# Patient Record
Sex: FEMALE | Employment: UNEMPLOYED | URBAN - METROPOLITAN AREA
[De-identification: names, ages, dates, MRNs, and addresses within clinical notes are randomized per-mention and may not be internally consistent; named-entity substitution may affect disease eponyms.]

---

## 2019-03-19 ENCOUNTER — OFFICE VISIT (OUTPATIENT)
Dept: INTERNAL MEDICINE CLINIC | Facility: CLINIC | Age: 59
End: 2019-03-19

## 2019-03-19 VITALS
BODY MASS INDEX: 30.4 KG/M2 | WEIGHT: 161 LBS | RESPIRATION RATE: 17 BRPM | SYSTOLIC BLOOD PRESSURE: 112 MMHG | DIASTOLIC BLOOD PRESSURE: 64 MMHG | HEIGHT: 61 IN | OXYGEN SATURATION: 96 % | HEART RATE: 72 BPM

## 2019-03-19 DIAGNOSIS — Z00.00 ANNUAL PHYSICAL EXAM: Primary | ICD-10-CM

## 2019-03-19 DIAGNOSIS — Z12.39 BREAST CANCER SCREENING: ICD-10-CM

## 2019-03-19 DIAGNOSIS — M17.11 ARTHRITIS OF RIGHT KNEE: ICD-10-CM

## 2019-03-19 DIAGNOSIS — M21.42 PES PLANUS OF BOTH FEET: ICD-10-CM

## 2019-03-19 DIAGNOSIS — Z41.1 ENCOUNTER FOR COSMETIC SURGERY: ICD-10-CM

## 2019-03-19 DIAGNOSIS — M21.41 PES PLANUS OF BOTH FEET: ICD-10-CM

## 2019-03-19 DIAGNOSIS — M72.2 PLANTAR FASCIITIS OF LEFT FOOT: ICD-10-CM

## 2019-03-19 PROBLEM — M21.40 FLAT FOOT: Status: ACTIVE | Noted: 2019-03-19

## 2019-03-19 PROCEDURE — 99386 PREV VISIT NEW AGE 40-64: CPT | Performed by: FAMILY MEDICINE

## 2019-03-20 NOTE — PROGRESS NOTES
HPI:   Brittny Rainey is a 62year old female who presents for a complete physical exam/BECOMING ESTABLISHED  Last pap:  2 YEARS AGO--NORMAL  Last mammogram:  2 YEARS AGO  Family hx of breast, ovarian, cervical or colon CA:  NONE      PATIENT STATES SHE HA PROMINENT BAGS UNDER BOTH EYES, NO PTOSIS  NECK: supple,no adenopathy  LUNGS: clear to auscultation  CARDIO: RRR without murmur  GI: good BS's,no masses, HSM or tenderness  EXTREMITIES: no edema  MSKL:  TENDERNESS OVER RIGHT ANTEROMEDIAL KNEE, NO EFFUSIONS

## 2019-05-18 ENCOUNTER — HOSPITAL ENCOUNTER (OUTPATIENT)
Dept: MAMMOGRAPHY | Facility: HOSPITAL | Age: 59
Discharge: HOME | End: 2019-05-18
Attending: FAMILY MEDICINE
Payer: COMMERCIAL

## 2019-05-18 DIAGNOSIS — Z12.39 BREAST CANCER SCREENING: ICD-10-CM

## 2019-05-18 PROCEDURE — 77067 SCR MAMMO BI INCL CAD: CPT | Performed by: FAMILY MEDICINE

## 2019-05-18 PROCEDURE — 77063 BREAST TOMOSYNTHESIS BI: CPT | Performed by: FAMILY MEDICINE

## 2019-06-28 ENCOUNTER — TELEPHONE (OUTPATIENT)
Dept: INTERNAL MEDICINE CLINIC | Facility: CLINIC | Age: 59
End: 2019-06-28

## 2019-06-28 ENCOUNTER — OFFICE VISIT (OUTPATIENT)
Dept: INTERNAL MEDICINE CLINIC | Facility: CLINIC | Age: 59
End: 2019-06-28

## 2019-06-28 VITALS
DIASTOLIC BLOOD PRESSURE: 72 MMHG | OXYGEN SATURATION: 96 % | WEIGHT: 159 LBS | HEART RATE: 73 BPM | SYSTOLIC BLOOD PRESSURE: 118 MMHG | HEIGHT: 61 IN | BODY MASS INDEX: 30.02 KG/M2

## 2019-06-28 DIAGNOSIS — Z00.00 ANNUAL PHYSICAL EXAM: ICD-10-CM

## 2019-06-28 DIAGNOSIS — M62.830 SPASM OF THORACIC BACK MUSCLE: ICD-10-CM

## 2019-06-28 DIAGNOSIS — J98.01 COUGH DUE TO BRONCHOSPASM: Primary | ICD-10-CM

## 2019-06-28 PROCEDURE — 99213 OFFICE O/P EST LOW 20 MIN: CPT | Performed by: FAMILY MEDICINE

## 2019-06-28 RX ORDER — ALBUTEROL SULFATE 90 UG/1
2 AEROSOL, METERED RESPIRATORY (INHALATION) EVERY 4 HOURS PRN
Qty: 1 INHALER | Refills: 3 | Status: SHIPPED | OUTPATIENT
Start: 2019-06-28 | End: 2021-02-25 | Stop reason: ALTCHOICE

## 2019-06-28 RX ORDER — BACLOFEN 10 MG/1
10 TABLET ORAL 2 TIMES DAILY PRN
Qty: 30 TABLET | Refills: 3 | Status: SHIPPED | OUTPATIENT
Start: 2019-06-28 | End: 2021-02-25 | Stop reason: ALTCHOICE

## 2019-06-28 NOTE — PROGRESS NOTES
CC:  Sore Throat (Patient complains of sore throat) and Cough (Patient complains of cough)      Hx of CC:  ONSET OF URI 3 DAYS AGO WITH PERSISTENT COUGH. C/O RIGHT UPPER SHOULDER AND BACK MUSCLE TIGHTNESS. 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     Vital

## 2019-07-06 ENCOUNTER — NURSE ONLY (OUTPATIENT)
Dept: INTERNAL MEDICINE CLINIC | Facility: CLINIC | Age: 59
End: 2019-07-06

## 2019-07-06 DIAGNOSIS — Z00.00 ANNUAL PHYSICAL EXAM: ICD-10-CM

## 2019-07-06 LAB
ALBUMIN SERPL-MCNC: 3.6 G/DL (ref 3.4–5)
ALBUMIN/GLOB SERPL: 1 {RATIO} (ref 1–2)
ALP LIVER SERPL-CCNC: 110 U/L (ref 46–118)
ALT SERPL-CCNC: 20 U/L (ref 13–56)
ANION GAP SERPL CALC-SCNC: 5 MMOL/L (ref 0–18)
AST SERPL-CCNC: 19 U/L (ref 15–37)
BILIRUB SERPL-MCNC: 0.3 MG/DL (ref 0.1–2)
BUN BLD-MCNC: 15 MG/DL (ref 7–18)
BUN/CREAT SERPL: 20 (ref 10–20)
CALCIUM BLD-MCNC: 9.4 MG/DL (ref 8.5–10.1)
CHLORIDE SERPL-SCNC: 113 MMOL/L (ref 98–112)
CHOLEST SMN-MCNC: 170 MG/DL (ref ?–200)
CO2 SERPL-SCNC: 27 MMOL/L (ref 21–32)
CREAT BLD-MCNC: 0.75 MG/DL (ref 0.55–1.02)
GLOBULIN PLAS-MCNC: 3.6 G/DL (ref 2.8–4.4)
GLUCOSE BLD-MCNC: 99 MG/DL (ref 70–99)
HDLC SERPL-MCNC: 48 MG/DL (ref 40–59)
LDLC SERPL CALC-MCNC: 94 MG/DL (ref ?–100)
M PROTEIN MFR SERPL ELPH: 7.2 G/DL (ref 6.4–8.2)
NONHDLC SERPL-MCNC: 122 MG/DL (ref ?–130)
OSMOLALITY SERPL CALC.SUM OF ELEC: 301 MOSM/KG (ref 275–295)
PATIENT FASTING: YES
PATIENT FASTING: YES
POTASSIUM SERPL-SCNC: 4.1 MMOL/L (ref 3.5–5.1)
SODIUM SERPL-SCNC: 145 MMOL/L (ref 136–145)
TRIGL SERPL-MCNC: 140 MG/DL (ref 30–149)
VLDLC SERPL CALC-MCNC: 28 MG/DL (ref 0–30)

## 2019-07-06 PROCEDURE — 80053 COMPREHEN METABOLIC PANEL: CPT | Performed by: FAMILY MEDICINE

## 2019-07-06 PROCEDURE — 80061 LIPID PANEL: CPT | Performed by: FAMILY MEDICINE

## 2019-07-06 PROCEDURE — 36415 COLL VENOUS BLD VENIPUNCTURE: CPT | Performed by: FAMILY MEDICINE

## 2019-09-19 ENCOUNTER — OFFICE VISIT (OUTPATIENT)
Dept: INTERNAL MEDICINE CLINIC | Facility: CLINIC | Age: 59
End: 2019-09-19

## 2019-09-19 VITALS
SYSTOLIC BLOOD PRESSURE: 118 MMHG | HEIGHT: 61 IN | OXYGEN SATURATION: 98 % | DIASTOLIC BLOOD PRESSURE: 70 MMHG | TEMPERATURE: 98 F | RESPIRATION RATE: 18 BRPM | BODY MASS INDEX: 30.33 KG/M2 | WEIGHT: 160.63 LBS | HEART RATE: 64 BPM

## 2019-09-19 DIAGNOSIS — M62.830 SPASM OF THORACIC BACK MUSCLE: ICD-10-CM

## 2019-09-19 DIAGNOSIS — M77.8 TENDINITIS OF RIGHT SHOULDER: Primary | ICD-10-CM

## 2019-09-19 PROCEDURE — 20550 NJX 1 TENDON SHEATH/LIGAMENT: CPT | Performed by: FAMILY MEDICINE

## 2019-09-19 PROCEDURE — 99213 OFFICE O/P EST LOW 20 MIN: CPT | Performed by: FAMILY MEDICINE

## 2019-09-19 RX ORDER — TRIAMCINOLONE ACETONIDE 40 MG/ML
40 INJECTION, SUSPENSION INTRA-ARTICULAR; INTRAMUSCULAR ONCE
Status: COMPLETED | OUTPATIENT
Start: 2019-09-19 | End: 2019-09-19

## 2019-09-19 RX ORDER — NABUMETONE 750 MG/1
750 TABLET, FILM COATED ORAL 2 TIMES DAILY PRN
Qty: 60 TABLET | Refills: 0 | Status: SHIPPED | OUTPATIENT
Start: 2019-09-19 | End: 2021-02-25 | Stop reason: ALTCHOICE

## 2019-09-19 RX ADMIN — TRIAMCINOLONE ACETONIDE 40 MG: 40 INJECTION, SUSPENSION INTRA-ARTICULAR; INTRAMUSCULAR at 17:28:00

## 2019-09-20 NOTE — PROGRESS NOTES
CC:  Follow - Up (f/u on test results) and Shoulder Pain (pain on shoulder)      Hx of CC:  CHRONIC WORSENING RIGHT SHOULDER PAIN. 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  ALSO WITH LEFT UPPER BACK PAIN AND TENSION. STRETCHING, ETC    None  No orders of the defined types were placed in this encounter.

## 2019-09-25 ENCOUNTER — TELEPHONE (OUTPATIENT)
Dept: INTERNAL MEDICINE CLINIC | Facility: CLINIC | Age: 59
End: 2019-09-25

## 2019-12-27 NOTE — TELEPHONE ENCOUNTER
Refaxed medical records request to [facility] @ fax # [phone]. Requesting Colonoscopy results.  **2nd request**

## 2021-02-25 ENCOUNTER — OFFICE VISIT (OUTPATIENT)
Dept: INTERNAL MEDICINE CLINIC | Facility: CLINIC | Age: 61
End: 2021-02-25
Payer: MEDICAID

## 2021-02-25 VITALS
OXYGEN SATURATION: 98 % | WEIGHT: 177 LBS | BODY MASS INDEX: 33.42 KG/M2 | HEART RATE: 70 BPM | DIASTOLIC BLOOD PRESSURE: 68 MMHG | RESPIRATION RATE: 16 BRPM | HEIGHT: 61 IN | SYSTOLIC BLOOD PRESSURE: 126 MMHG

## 2021-02-25 DIAGNOSIS — Z01.419 PAP SMEAR, AS PART OF ROUTINE GYNECOLOGICAL EXAMINATION: ICD-10-CM

## 2021-02-25 DIAGNOSIS — R23.8 EASY BRUISING: ICD-10-CM

## 2021-02-25 DIAGNOSIS — Z12.11 SCREENING FOR COLON CANCER: ICD-10-CM

## 2021-02-25 DIAGNOSIS — Z12.31 ENCOUNTER FOR SCREENING MAMMOGRAM FOR MALIGNANT NEOPLASM OF BREAST: ICD-10-CM

## 2021-02-25 DIAGNOSIS — Z13.220 SCREENING FOR LIPID DISORDERS: ICD-10-CM

## 2021-02-25 DIAGNOSIS — Z00.00 PREVENTATIVE HEALTH CARE: Primary | ICD-10-CM

## 2021-02-25 DIAGNOSIS — Z13.1 SCREENING FOR DIABETES MELLITUS: ICD-10-CM

## 2021-02-25 DIAGNOSIS — R13.10 SWALLOWING PROBLEM: ICD-10-CM

## 2021-02-25 LAB
ALBUMIN SERPL-MCNC: 3.8 G/DL (ref 3.4–5)
ALBUMIN/GLOB SERPL: 1 {RATIO} (ref 1–2)
ALP LIVER SERPL-CCNC: 129 U/L
ALT SERPL-CCNC: 22 U/L
ANION GAP SERPL CALC-SCNC: 2 MMOL/L (ref 0–18)
AST SERPL-CCNC: 18 U/L (ref 15–37)
BILIRUB SERPL-MCNC: 0.4 MG/DL (ref 0.1–2)
BUN BLD-MCNC: 13 MG/DL (ref 7–18)
BUN/CREAT SERPL: 17.8 (ref 10–20)
CALCIUM BLD-MCNC: 9.5 MG/DL (ref 8.5–10.1)
CHLORIDE SERPL-SCNC: 108 MMOL/L (ref 98–112)
CHOLEST SMN-MCNC: 238 MG/DL (ref ?–200)
CO2 SERPL-SCNC: 32 MMOL/L (ref 21–32)
CREAT BLD-MCNC: 0.73 MG/DL
DEPRECATED RDW RBC AUTO: 43.1 FL (ref 35.1–46.3)
ERYTHROCYTE [DISTWIDTH] IN BLOOD BY AUTOMATED COUNT: 12.4 % (ref 11–15)
EST. AVERAGE GLUCOSE BLD GHB EST-MCNC: 134 MG/DL (ref 68–126)
GLOBULIN PLAS-MCNC: 3.7 G/DL (ref 2.8–4.4)
GLUCOSE BLD-MCNC: 84 MG/DL (ref 70–99)
HBA1C MFR BLD HPLC: 6.3 % (ref ?–5.7)
HCT VFR BLD AUTO: 45.4 %
HDLC SERPL-MCNC: 49 MG/DL (ref 40–59)
HGB BLD-MCNC: 14.6 G/DL
LDLC SERPL CALC-MCNC: 147 MG/DL (ref ?–100)
M PROTEIN MFR SERPL ELPH: 7.5 G/DL (ref 6.4–8.2)
MCH RBC QN AUTO: 30.4 PG (ref 26–34)
MCHC RBC AUTO-ENTMCNC: 32.2 G/DL (ref 31–37)
MCV RBC AUTO: 94.6 FL
NONHDLC SERPL-MCNC: 189 MG/DL (ref ?–130)
OSMOLALITY SERPL CALC.SUM OF ELEC: 293 MOSM/KG (ref 275–295)
PATIENT FASTING Y/N/NP: YES
PATIENT FASTING Y/N/NP: YES
PLATELET # BLD AUTO: 268 10(3)UL (ref 150–450)
POTASSIUM SERPL-SCNC: 3.9 MMOL/L (ref 3.5–5.1)
RBC # BLD AUTO: 4.8 X10(6)UL
SODIUM SERPL-SCNC: 142 MMOL/L (ref 136–145)
TRIGL SERPL-MCNC: 212 MG/DL (ref 30–149)
VLDLC SERPL CALC-MCNC: 42 MG/DL (ref 0–30)
WBC # BLD AUTO: 8.1 X10(3) UL (ref 4–11)

## 2021-02-25 PROCEDURE — 83036 HEMOGLOBIN GLYCOSYLATED A1C: CPT | Performed by: FAMILY MEDICINE

## 2021-02-25 PROCEDURE — 3078F DIAST BP <80 MM HG: CPT | Performed by: FAMILY MEDICINE

## 2021-02-25 PROCEDURE — 80061 LIPID PANEL: CPT | Performed by: FAMILY MEDICINE

## 2021-02-25 PROCEDURE — 88175 CYTOPATH C/V AUTO FLUID REDO: CPT | Performed by: FAMILY MEDICINE

## 2021-02-25 PROCEDURE — 87624 HPV HI-RISK TYP POOLED RSLT: CPT | Performed by: FAMILY MEDICINE

## 2021-02-25 PROCEDURE — 3074F SYST BP LT 130 MM HG: CPT | Performed by: FAMILY MEDICINE

## 2021-02-25 PROCEDURE — 99396 PREV VISIT EST AGE 40-64: CPT | Performed by: FAMILY MEDICINE

## 2021-02-25 PROCEDURE — 85027 COMPLETE CBC AUTOMATED: CPT | Performed by: FAMILY MEDICINE

## 2021-02-25 PROCEDURE — 3008F BODY MASS INDEX DOCD: CPT | Performed by: FAMILY MEDICINE

## 2021-02-25 PROCEDURE — 80053 COMPREHEN METABOLIC PANEL: CPT | Performed by: FAMILY MEDICINE

## 2021-02-25 NOTE — PROGRESS NOTES
PATIENT IDENTIFICATION  Name: Brooke Russell  MRN: [id number]    Diagnoses:   Preventative health care  (primary encounter diagnosis)  Screening for lipid disorders  Pap smear, as part of routine gynecological examination  Screening for diabetes mellitus Known Problems Maternal Cousin Male    • No Known Problems Paternal Cousin Female    • No Known Problems Paternal Cousin Male    • No Known Problems Other    • No Known Problems Father    • Breast Cancer Neg    • Ovarian Cancer Neg    • DCIS Neg    • LCIS (CPT=77067/99126); Future    6. Screening for colon cancer  - GASTRO - INTERNAL    7. Easy bruising  - CBC, PLATELET; NO DIFFERENTIAL    8. Swallowing problem  - ENT - INTERNAL  1-7.  Discussed importance of lifestyle changes including diet and regular phys

## 2021-02-26 LAB — HPV I/H RISK 1 DNA SPEC QL NAA+PROBE: NEGATIVE

## 2021-08-20 ENCOUNTER — OFFICE VISIT (OUTPATIENT)
Dept: INTERNAL MEDICINE CLINIC | Facility: CLINIC | Age: 61
End: 2021-08-20
Payer: MEDICAID

## 2021-08-20 VITALS
WEIGHT: 174.63 LBS | OXYGEN SATURATION: 99 % | DIASTOLIC BLOOD PRESSURE: 76 MMHG | HEIGHT: 61 IN | HEART RATE: 70 BPM | BODY MASS INDEX: 32.97 KG/M2 | SYSTOLIC BLOOD PRESSURE: 118 MMHG

## 2021-08-20 DIAGNOSIS — R74.8 ELEVATED ALKALINE PHOSPHATASE LEVEL: Primary | ICD-10-CM

## 2021-08-20 DIAGNOSIS — I83.93 ASYMPTOMATIC VARICOSE VEINS OF BOTH LOWER EXTREMITIES: ICD-10-CM

## 2021-08-20 DIAGNOSIS — M72.2 PLANTAR FASCIITIS OF LEFT FOOT: ICD-10-CM

## 2021-08-20 DIAGNOSIS — R73.03 PREDIABETES: ICD-10-CM

## 2021-08-20 PROCEDURE — 3074F SYST BP LT 130 MM HG: CPT | Performed by: FAMILY MEDICINE

## 2021-08-20 PROCEDURE — 99214 OFFICE O/P EST MOD 30 MIN: CPT | Performed by: FAMILY MEDICINE

## 2021-08-20 PROCEDURE — 3008F BODY MASS INDEX DOCD: CPT | Performed by: FAMILY MEDICINE

## 2021-08-20 PROCEDURE — 3078F DIAST BP <80 MM HG: CPT | Performed by: FAMILY MEDICINE

## 2021-08-20 NOTE — PROGRESS NOTES
PATIENT IDENTIFICATION  Name: Pranay Rausch  MRN: [id number]    Diagnoses:   Elevated alkaline phosphatase level  (primary encounter diagnosis)  Prediabetes  Plantar fasciitis of left foot  Asymptomatic varicose veins of both lower extremities    SUBJECTI BMI 32.99 kg/m²   General Appearance: in no apparent distress and well developed and well nourished  HEENT: Normocephalic, atraumatic,   Lungs: normal respiratory effort. Extremities:no edema of LEs, scattered spider and varicose veins of bilateral LEs.

## 2021-08-25 ENCOUNTER — NURSE ONLY (OUTPATIENT)
Dept: INTERNAL MEDICINE CLINIC | Facility: CLINIC | Age: 61
End: 2021-08-25
Payer: MEDICAID

## 2021-08-25 DIAGNOSIS — R74.8 ELEVATED ALKALINE PHOSPHATASE LEVEL: ICD-10-CM

## 2021-08-25 DIAGNOSIS — R73.03 PREDIABETES: ICD-10-CM

## 2021-08-25 LAB
ALBUMIN SERPL-MCNC: 3.4 G/DL (ref 3.4–5)
ALBUMIN/GLOB SERPL: 0.9 {RATIO} (ref 1–2)
ALP LIVER SERPL-CCNC: 108 U/L
ALT SERPL-CCNC: 26 U/L
ANION GAP SERPL CALC-SCNC: 5 MMOL/L (ref 0–18)
AST SERPL-CCNC: 20 U/L (ref 15–37)
BILIRUB SERPL-MCNC: 0.4 MG/DL (ref 0.1–2)
BUN BLD-MCNC: 15 MG/DL (ref 7–18)
BUN/CREAT SERPL: 20.8 (ref 10–20)
CALCIUM BLD-MCNC: 9.3 MG/DL (ref 8.5–10.1)
CHLORIDE SERPL-SCNC: 110 MMOL/L (ref 98–112)
CO2 SERPL-SCNC: 27 MMOL/L (ref 21–32)
CREAT BLD-MCNC: 0.72 MG/DL
EST. AVERAGE GLUCOSE BLD GHB EST-MCNC: 131 MG/DL (ref 68–126)
GLOBULIN PLAS-MCNC: 3.7 G/DL (ref 2.8–4.4)
GLUCOSE BLD-MCNC: 106 MG/DL (ref 70–99)
HBA1C MFR BLD HPLC: 6.2 % (ref ?–5.7)
M PROTEIN MFR SERPL ELPH: 7.1 G/DL (ref 6.4–8.2)
OSMOLALITY SERPL CALC.SUM OF ELEC: 295 MOSM/KG (ref 275–295)
PATIENT FASTING Y/N/NP: YES
POTASSIUM SERPL-SCNC: 4 MMOL/L (ref 3.5–5.1)
SODIUM SERPL-SCNC: 142 MMOL/L (ref 136–145)

## 2021-08-25 PROCEDURE — 83036 HEMOGLOBIN GLYCOSYLATED A1C: CPT | Performed by: FAMILY MEDICINE

## 2021-08-25 PROCEDURE — 80053 COMPREHEN METABOLIC PANEL: CPT | Performed by: FAMILY MEDICINE

## 2021-08-25 PROCEDURE — 36415 COLL VENOUS BLD VENIPUNCTURE: CPT | Performed by: FAMILY MEDICINE

## 2022-02-15 ENCOUNTER — OFFICE VISIT (OUTPATIENT)
Dept: INTERNAL MEDICINE CLINIC | Facility: CLINIC | Age: 62
End: 2022-02-15
Payer: MEDICAID

## 2022-02-15 VITALS
BODY MASS INDEX: 33.04 KG/M2 | WEIGHT: 175 LBS | DIASTOLIC BLOOD PRESSURE: 68 MMHG | HEART RATE: 75 BPM | HEIGHT: 61 IN | SYSTOLIC BLOOD PRESSURE: 120 MMHG | OXYGEN SATURATION: 98 %

## 2022-02-15 DIAGNOSIS — G89.29 CHRONIC SI JOINT PAIN: ICD-10-CM

## 2022-02-15 DIAGNOSIS — R73.03 PREDIABETES: Primary | ICD-10-CM

## 2022-02-15 DIAGNOSIS — Z12.11 COLON CANCER SCREENING: ICD-10-CM

## 2022-02-15 DIAGNOSIS — M53.3 CHRONIC SI JOINT PAIN: ICD-10-CM

## 2022-02-15 DIAGNOSIS — G44.209 TENSION HEADACHE: ICD-10-CM

## 2022-02-15 PROCEDURE — 3008F BODY MASS INDEX DOCD: CPT | Performed by: FAMILY MEDICINE

## 2022-02-15 PROCEDURE — 3074F SYST BP LT 130 MM HG: CPT | Performed by: FAMILY MEDICINE

## 2022-02-15 PROCEDURE — 99214 OFFICE O/P EST MOD 30 MIN: CPT | Performed by: FAMILY MEDICINE

## 2022-02-15 PROCEDURE — 3078F DIAST BP <80 MM HG: CPT | Performed by: FAMILY MEDICINE

## 2022-04-09 ENCOUNTER — LAB ENCOUNTER (OUTPATIENT)
Dept: LAB | Age: 62
End: 2022-04-09
Attending: FAMILY MEDICINE
Payer: MEDICAID

## 2022-04-09 DIAGNOSIS — Z13.220 SCREENING FOR LIPOID DISORDERS: Primary | ICD-10-CM

## 2022-04-09 LAB
ALBUMIN SERPL-MCNC: 3.4 G/DL (ref 3.4–5)
ALBUMIN/GLOB SERPL: 1 {RATIO} (ref 1–2)
ALP LIVER SERPL-CCNC: 117 U/L
ALT SERPL-CCNC: 22 U/L
ANION GAP SERPL CALC-SCNC: 7 MMOL/L (ref 0–18)
AST SERPL-CCNC: 20 U/L (ref 15–37)
BILIRUB SERPL-MCNC: 0.4 MG/DL (ref 0.1–2)
BUN BLD-MCNC: 15 MG/DL (ref 7–18)
BUN/CREAT SERPL: 21.7 (ref 10–20)
CALCIUM BLD-MCNC: 9.2 MG/DL (ref 8.5–10.1)
CHLORIDE SERPL-SCNC: 106 MMOL/L (ref 98–112)
CHOLEST SERPL-MCNC: 210 MG/DL (ref ?–200)
CO2 SERPL-SCNC: 30 MMOL/L (ref 21–32)
CREAT BLD-MCNC: 0.69 MG/DL
EST. AVERAGE GLUCOSE BLD GHB EST-MCNC: 128 MG/DL (ref 68–126)
FASTING PATIENT LIPID ANSWER: YES
FASTING STATUS PATIENT QL REPORTED: YES
GLOBULIN PLAS-MCNC: 3.4 G/DL (ref 2.8–4.4)
GLUCOSE BLD-MCNC: 102 MG/DL (ref 70–99)
HBA1C MFR BLD: 6.1 % (ref ?–5.7)
HDLC SERPL-MCNC: 50 MG/DL (ref 40–59)
LDLC SERPL CALC-MCNC: 124 MG/DL (ref ?–100)
NONHDLC SERPL-MCNC: 160 MG/DL (ref ?–130)
OSMOLALITY SERPL CALC.SUM OF ELEC: 297 MOSM/KG (ref 275–295)
POTASSIUM SERPL-SCNC: 4.4 MMOL/L (ref 3.5–5.1)
PROT SERPL-MCNC: 6.8 G/DL (ref 6.4–8.2)
SODIUM SERPL-SCNC: 143 MMOL/L (ref 136–145)
TRIGL SERPL-MCNC: 206 MG/DL (ref 30–149)
VLDLC SERPL CALC-MCNC: 37 MG/DL (ref 0–30)

## 2022-04-09 PROCEDURE — 36415 COLL VENOUS BLD VENIPUNCTURE: CPT | Performed by: FAMILY MEDICINE

## 2022-04-09 PROCEDURE — 80061 LIPID PANEL: CPT

## 2022-04-09 PROCEDURE — 80053 COMPREHEN METABOLIC PANEL: CPT | Performed by: FAMILY MEDICINE

## 2022-04-09 PROCEDURE — 83036 HEMOGLOBIN GLYCOSYLATED A1C: CPT | Performed by: FAMILY MEDICINE

## 2022-04-12 ENCOUNTER — OFFICE VISIT (OUTPATIENT)
Dept: INTERNAL MEDICINE CLINIC | Facility: CLINIC | Age: 62
End: 2022-04-12
Payer: MEDICAID

## 2022-04-12 VITALS
DIASTOLIC BLOOD PRESSURE: 70 MMHG | HEART RATE: 68 BPM | HEIGHT: 61 IN | WEIGHT: 171 LBS | BODY MASS INDEX: 32.28 KG/M2 | SYSTOLIC BLOOD PRESSURE: 124 MMHG | OXYGEN SATURATION: 98 %

## 2022-04-12 DIAGNOSIS — R73.03 PREDIABETES: ICD-10-CM

## 2022-04-12 DIAGNOSIS — Z00.00 PREVENTATIVE HEALTH CARE: Primary | ICD-10-CM

## 2022-04-12 DIAGNOSIS — Z12.11 SCREENING FOR COLON CANCER: ICD-10-CM

## 2022-04-12 DIAGNOSIS — Z12.31 ENCOUNTER FOR SCREENING MAMMOGRAM FOR MALIGNANT NEOPLASM OF BREAST: ICD-10-CM

## 2022-04-12 PROCEDURE — 3074F SYST BP LT 130 MM HG: CPT | Performed by: FAMILY MEDICINE

## 2022-04-12 PROCEDURE — 99396 PREV VISIT EST AGE 40-64: CPT | Performed by: FAMILY MEDICINE

## 2022-04-12 PROCEDURE — 3078F DIAST BP <80 MM HG: CPT | Performed by: FAMILY MEDICINE

## 2022-04-12 PROCEDURE — 3008F BODY MASS INDEX DOCD: CPT | Performed by: FAMILY MEDICINE

## 2022-04-15 PROBLEM — R73.03 PREDIABETES: Status: ACTIVE | Noted: 2022-04-15

## 2022-07-13 NOTE — TELEPHONE ENCOUNTER
Faxed over a medical records release form to [facility]. PAST MEDICAL HISTORY:  Cancer of ureter, right history of .. remission    Pacemaker

## 2022-07-28 ENCOUNTER — TELEPHONE (OUTPATIENT)
Dept: GASTROENTEROLOGY | Facility: CLINIC | Age: 62
End: 2022-07-28

## 2022-07-28 ENCOUNTER — OFFICE VISIT (OUTPATIENT)
Dept: GASTROENTEROLOGY | Facility: CLINIC | Age: 62
End: 2022-07-28
Payer: MEDICAID

## 2022-07-28 VITALS
WEIGHT: 173 LBS | DIASTOLIC BLOOD PRESSURE: 75 MMHG | BODY MASS INDEX: 32.66 KG/M2 | HEIGHT: 61 IN | HEART RATE: 63 BPM | SYSTOLIC BLOOD PRESSURE: 129 MMHG

## 2022-07-28 DIAGNOSIS — Z12.11 COLON CANCER SCREENING: ICD-10-CM

## 2022-07-28 DIAGNOSIS — R13.19 ESOPHAGEAL DYSPHAGIA: ICD-10-CM

## 2022-07-28 DIAGNOSIS — R49.0 QUALITY OF VOICE, HOARSE: Primary | ICD-10-CM

## 2022-07-28 DIAGNOSIS — Z12.11 COLON CANCER SCREENING: Primary | ICD-10-CM

## 2022-07-28 PROCEDURE — 3074F SYST BP LT 130 MM HG: CPT | Performed by: NURSE PRACTITIONER

## 2022-07-28 PROCEDURE — 3008F BODY MASS INDEX DOCD: CPT | Performed by: NURSE PRACTITIONER

## 2022-07-28 PROCEDURE — 3078F DIAST BP <80 MM HG: CPT | Performed by: NURSE PRACTITIONER

## 2022-07-28 PROCEDURE — 99244 OFF/OP CNSLTJ NEW/EST MOD 40: CPT | Performed by: NURSE PRACTITIONER

## 2022-07-28 RX ORDER — PANTOPRAZOLE SODIUM 40 MG/1
40 TABLET, DELAYED RELEASE ORAL DAILY
Qty: 30 TABLET | Refills: 1 | Status: SHIPPED | OUTPATIENT
Start: 2022-07-28 | End: 2022-08-27

## 2022-07-28 RX ORDER — POLYETHYLENE GLYCOL 3350, SODIUM CHLORIDE, SODIUM BICARBONATE, POTASSIUM CHLORIDE 420; 11.2; 5.72; 1.48 G/4L; G/4L; G/4L; G/4L
POWDER, FOR SOLUTION ORAL
Qty: 4000 ML | Refills: 0 | Status: SHIPPED | OUTPATIENT
Start: 2022-07-28

## 2022-07-28 NOTE — TELEPHONE ENCOUNTER
Scheduled for: Colonoscopy 43[address]   Provider Name: Dr Viky Dunham   Date: Cele Ryany 10/20/2022  Location: [address] [place] [address] 1   Sedation: MAC   Time: 8:30 am   Prep: split trilyte    Meds/Allergies Reconciled?: NKDA   Diagnosis with codes: crc screening Z12.11  Was patient informed to call insurance with codes (Y/N):  Yes  Referral sent?: Yes   [facility] or [address] notified?:  I sent an electronic request to Endo Scheduling and received a confirmation today. Medication Orders: Pt is aware to NOT take iron pills, herbal meds and diet supplements for 7 days before exam. Also to NOT take any form of alcohol, recreational drugs and any forms of ED meds 24 hours before exam.       Misc Orders: Patient was informed that they will need a COVID 19 test prior to their procedure. Patient verbally understood & will await a phone call from [facility] to schedule. Further instructions given by staff:  Instructions given in Anguillan and pt verbalized understanding

## 2022-11-01 ENCOUNTER — OFFICE VISIT (OUTPATIENT)
Dept: FAMILY MEDICINE CLINIC | Facility: CLINIC | Age: 62
End: 2022-11-01
Payer: MEDICAID

## 2022-11-01 VITALS
HEART RATE: 73 BPM | BODY MASS INDEX: 31.72 KG/M2 | SYSTOLIC BLOOD PRESSURE: 120 MMHG | TEMPERATURE: 97 F | DIASTOLIC BLOOD PRESSURE: 72 MMHG | WEIGHT: 168 LBS | HEIGHT: 61 IN

## 2022-11-01 DIAGNOSIS — Z12.31 ENCOUNTER FOR SCREENING MAMMOGRAM FOR MALIGNANT NEOPLASM OF BREAST: ICD-10-CM

## 2022-11-01 DIAGNOSIS — G89.29 CHRONIC BILATERAL LOW BACK PAIN, UNSPECIFIED WHETHER SCIATICA PRESENT: Primary | ICD-10-CM

## 2022-11-01 DIAGNOSIS — M54.50 CHRONIC BILATERAL LOW BACK PAIN, UNSPECIFIED WHETHER SCIATICA PRESENT: Primary | ICD-10-CM

## 2022-11-01 DIAGNOSIS — M79.672 BILATERAL FOOT PAIN: ICD-10-CM

## 2022-11-01 DIAGNOSIS — M79.671 BILATERAL FOOT PAIN: ICD-10-CM

## 2022-11-01 DIAGNOSIS — N81.9 FEMALE GENITAL PROLAPSE, UNSPECIFIED TYPE: ICD-10-CM

## 2022-11-01 DIAGNOSIS — Z78.0 POST-MENOPAUSAL: ICD-10-CM

## 2022-11-01 PROCEDURE — 3074F SYST BP LT 130 MM HG: CPT | Performed by: FAMILY MEDICINE

## 2022-11-01 PROCEDURE — 99214 OFFICE O/P EST MOD 30 MIN: CPT | Performed by: FAMILY MEDICINE

## 2022-11-01 PROCEDURE — 3008F BODY MASS INDEX DOCD: CPT | Performed by: FAMILY MEDICINE

## 2022-11-01 PROCEDURE — 3078F DIAST BP <80 MM HG: CPT | Performed by: FAMILY MEDICINE

## 2022-11-01 RX ORDER — IBUPROFEN 400 MG/1
400 TABLET ORAL EVERY 8 HOURS PRN
Qty: 30 TABLET | Refills: 0 | Status: SHIPPED | OUTPATIENT
Start: 2022-11-01

## 2022-11-01 RX ORDER — CALCIUM CARBONATE/VITAMIN D3 600 MG-10
1 TABLET ORAL DAILY
Qty: 90 TABLET | Refills: 3 | Status: SHIPPED | OUTPATIENT
Start: 2022-11-01

## 2022-11-30 ENCOUNTER — HOSPITAL ENCOUNTER (OUTPATIENT)
Age: 62
Setting detail: HOSPITAL OUTPATIENT SURGERY
Discharge: HOME | End: 2022-11-30
Attending: INTERNAL MEDICINE | Admitting: INTERNAL MEDICINE
Payer: MEDICAID

## 2022-11-30 ENCOUNTER — ANESTHESIA EVENT (OUTPATIENT)
Dept: ENDOSCOPY | Age: 62
End: 2022-11-30
Payer: MEDICAID

## 2022-11-30 ENCOUNTER — ANESTHESIA (OUTPATIENT)
Dept: ENDOSCOPY | Age: 62
End: 2022-11-30
Payer: MEDICAID

## 2022-11-30 VITALS
HEIGHT: 60 IN | OXYGEN SATURATION: 97 % | RESPIRATION RATE: 15 BRPM | TEMPERATURE: 98 F | DIASTOLIC BLOOD PRESSURE: 63 MMHG | HEART RATE: 55 BPM | SYSTOLIC BLOOD PRESSURE: 124 MMHG | BODY MASS INDEX: 28.47 KG/M2 | WEIGHT: 145 LBS

## 2022-11-30 DIAGNOSIS — Z12.11 COLON CANCER SCREENING: ICD-10-CM

## 2022-11-30 PROCEDURE — 99070 SPECIAL SUPPLIES PHYS/QHP: CPT | Performed by: INTERNAL MEDICINE

## 2022-11-30 PROCEDURE — 45380 COLONOSCOPY AND BIOPSY: CPT | Performed by: INTERNAL MEDICINE

## 2022-11-30 RX ORDER — SODIUM CHLORIDE, SODIUM LACTATE, POTASSIUM CHLORIDE, CALCIUM CHLORIDE 600; 310; 30; 20 MG/100ML; MG/100ML; MG/100ML; MG/100ML
INJECTION, SOLUTION INTRAVENOUS CONTINUOUS
Status: DISCONTINUED | OUTPATIENT
Start: 2022-11-30 | End: 2022-11-30

## 2022-11-30 RX ADMIN — SODIUM CHLORIDE, SODIUM LACTATE, POTASSIUM CHLORIDE, CALCIUM CHLORIDE: 600; 310; 30; 20 INJECTION, SOLUTION INTRAVENOUS at 09:38:00

## 2022-11-30 NOTE — DISCHARGE INSTRUCTIONS

## 2022-12-02 ENCOUNTER — MED REC SCAN ONLY (OUTPATIENT)
Facility: CLINIC | Age: 62
End: 2022-12-02

## 2022-12-30 ENCOUNTER — TELEPHONE (OUTPATIENT)
Facility: CLINIC | Age: 62
End: 2022-12-30

## 2022-12-30 NOTE — TELEPHONE ENCOUNTER
----- Message from Leilani Yin MD sent at 12/29/2022  3:03 PM CST -----  Denies NSAIDs-- Had TI erosions  Follow up with Adam Moscoso NP to discuss EGD/esophagram and possible small bowel imaging.  Ok to use MD approval slot  GI staff: please place recall for colonoscopy in 3 years

## 2022-12-30 NOTE — TELEPHONE ENCOUNTER
Health maintenance updated. Last colonoscopy done 11/30/22   3 year recall placed into Pt Outreach   Next due on 11/30/25 per Dr. Porfirio Clemons. Language Line  [id number]    Left message to call back re: set up appt with Ngoc    CSS: When patient calls back, pls assist setting up appt with Sallyann Saint    Thank you.

## 2023-01-06 NOTE — TELEPHONE ENCOUNTER
Left message to call back using  [facility] ID # [id number]    CSS:  If patient calls back please assist with scheduling office visit with Gloria JERONIMO (ok to offer res 24)

## 2023-01-12 NOTE — TELEPHONE ENCOUNTER
PATRICIA Enamorado    We could not reach patient x4 attempts so no response letter mailed. Thank you    I tried to call directly from our line and again using  [facility] ID # [id number]. Left message to all back both times. CSS:  If patient calls back, please schedule follow up with Connie to use res 24. Our office tried to contact patient on three separate occasions with no answer or call back. No response letter mailed to home address. Encounter closed per protocol.

## 2023-04-01 ENCOUNTER — LAB ENCOUNTER (OUTPATIENT)
Dept: LAB | Age: 63
End: 2023-04-01
Attending: NURSE PRACTITIONER
Payer: MEDICAID

## 2023-04-01 ENCOUNTER — OFFICE VISIT (OUTPATIENT)
Dept: FAMILY MEDICINE CLINIC | Facility: CLINIC | Age: 63
End: 2023-04-01

## 2023-04-01 VITALS
WEIGHT: 171 LBS | SYSTOLIC BLOOD PRESSURE: 122 MMHG | HEIGHT: 60 IN | BODY MASS INDEX: 33.57 KG/M2 | DIASTOLIC BLOOD PRESSURE: 80 MMHG | HEART RATE: 61 BPM

## 2023-04-01 DIAGNOSIS — Z12.31 SCREENING MAMMOGRAM FOR BREAST CANCER: ICD-10-CM

## 2023-04-01 DIAGNOSIS — E66.9 OBESITY (BMI 30.0-34.9): ICD-10-CM

## 2023-04-01 DIAGNOSIS — Z00.00 WELL ADULT EXAM: ICD-10-CM

## 2023-04-01 DIAGNOSIS — Z00.00 WELL ADULT EXAM: Primary | ICD-10-CM

## 2023-04-01 LAB
ALBUMIN SERPL-MCNC: 3.2 G/DL (ref 3.4–5)
ALBUMIN/GLOB SERPL: 0.8 {RATIO} (ref 1–2)
ALP LIVER SERPL-CCNC: 120 U/L
ALT SERPL-CCNC: 22 U/L
ANION GAP SERPL CALC-SCNC: 5 MMOL/L (ref 0–18)
AST SERPL-CCNC: 19 U/L (ref 15–37)
BILIRUB SERPL-MCNC: 0.3 MG/DL (ref 0.1–2)
BUN BLD-MCNC: 16 MG/DL (ref 7–18)
BUN/CREAT SERPL: 22.2 (ref 10–20)
CALCIUM BLD-MCNC: 9.1 MG/DL (ref 8.5–10.1)
CHLORIDE SERPL-SCNC: 109 MMOL/L (ref 98–112)
CHOLEST SERPL-MCNC: 187 MG/DL (ref ?–200)
CO2 SERPL-SCNC: 29 MMOL/L (ref 21–32)
CREAT BLD-MCNC: 0.72 MG/DL
DEPRECATED RDW RBC AUTO: 40.7 FL (ref 35.1–46.3)
ERYTHROCYTE [DISTWIDTH] IN BLOOD BY AUTOMATED COUNT: 12.4 % (ref 11–15)
EST. AVERAGE GLUCOSE BLD GHB EST-MCNC: 134 MG/DL (ref 68–126)
FASTING PATIENT LIPID ANSWER: YES
FASTING STATUS PATIENT QL REPORTED: YES
GFR SERPLBLD BASED ON 1.73 SQ M-ARVRAT: 94 ML/MIN/1.73M2 (ref 60–?)
GLOBULIN PLAS-MCNC: 3.8 G/DL (ref 2.8–4.4)
GLUCOSE BLD-MCNC: 115 MG/DL (ref 70–99)
HBA1C MFR BLD: 6.3 % (ref ?–5.7)
HCT VFR BLD AUTO: 41.3 %
HDLC SERPL-MCNC: 53 MG/DL (ref 40–59)
HGB BLD-MCNC: 13.5 G/DL
LDLC SERPL CALC-MCNC: 106 MG/DL (ref ?–100)
MCH RBC QN AUTO: 29.4 PG (ref 26–34)
MCHC RBC AUTO-ENTMCNC: 32.7 G/DL (ref 31–37)
MCV RBC AUTO: 90 FL
NONHDLC SERPL-MCNC: 134 MG/DL (ref ?–130)
OSMOLALITY SERPL CALC.SUM OF ELEC: 298 MOSM/KG (ref 275–295)
PLATELET # BLD AUTO: 245 10(3)UL (ref 150–450)
POTASSIUM SERPL-SCNC: 3.9 MMOL/L (ref 3.5–5.1)
PROT SERPL-MCNC: 7 G/DL (ref 6.4–8.2)
RBC # BLD AUTO: 4.59 X10(6)UL
SODIUM SERPL-SCNC: 143 MMOL/L (ref 136–145)
TRIGL SERPL-MCNC: 158 MG/DL (ref 30–149)
TSI SER-ACNC: 2.02 MIU/ML (ref 0.36–3.74)
VIT B12 SERPL-MCNC: 306 PG/ML (ref 193–986)
VIT D+METAB SERPL-MCNC: 30.5 NG/ML (ref 30–100)
VLDLC SERPL CALC-MCNC: 27 MG/DL (ref 0–30)
WBC # BLD AUTO: 8.1 X10(3) UL (ref 4–11)

## 2023-04-01 PROCEDURE — 82306 VITAMIN D 25 HYDROXY: CPT | Performed by: NURSE PRACTITIONER

## 2023-04-01 PROCEDURE — 84443 ASSAY THYROID STIM HORMONE: CPT

## 2023-04-01 PROCEDURE — 80053 COMPREHEN METABOLIC PANEL: CPT

## 2023-04-01 PROCEDURE — 3074F SYST BP LT 130 MM HG: CPT | Performed by: NURSE PRACTITIONER

## 2023-04-01 PROCEDURE — 3008F BODY MASS INDEX DOCD: CPT | Performed by: NURSE PRACTITIONER

## 2023-04-01 PROCEDURE — 83036 HEMOGLOBIN GLYCOSYLATED A1C: CPT

## 2023-04-01 PROCEDURE — 80061 LIPID PANEL: CPT

## 2023-04-01 PROCEDURE — 99396 PREV VISIT EST AGE 40-64: CPT | Performed by: NURSE PRACTITIONER

## 2023-04-01 PROCEDURE — 82607 VITAMIN B-12: CPT

## 2023-04-01 PROCEDURE — 3079F DIAST BP 80-89 MM HG: CPT | Performed by: NURSE PRACTITIONER

## 2023-04-01 PROCEDURE — 36415 COLL VENOUS BLD VENIPUNCTURE: CPT

## 2023-04-01 PROCEDURE — 85027 COMPLETE CBC AUTOMATED: CPT

## 2023-04-01 RX ORDER — MULTIVITAMIN
1 TABLET ORAL DAILY
COMMUNITY
Start: 2023-02-12

## 2023-04-02 NOTE — ASSESSMENT & PLAN NOTE
Please aim to eat a diet high in fresh fruits and vegetables, lean protein sources, complex carbohydrates and limited processed and fast foods. Try to get at least 150 minutes of exercise per week-a combination of weight resistance and cardio is preferred.     Screening labs  Mammogram ordered  Up to date colon ca screening  Has referral to urogyne for possible vaginal prolapse

## 2023-04-05 ENCOUNTER — TELEPHONE (OUTPATIENT)
Dept: FAMILY MEDICINE CLINIC | Facility: CLINIC | Age: 63
End: 2023-04-05

## 2023-04-05 NOTE — TELEPHONE ENCOUNTER
Patient contacted to provide her test results. She also states she has trouble schedule appts and hard time getting trhough due to language barrier. She needs assistance with mammogram appt. I can call for her tomorrow; she prefers Saturday at [address] [place] [address].

## 2023-04-06 NOTE — TELEPHONE ENCOUNTER
Attempted to call Central scheduling. Spoke to Yoder, . Several questions was asked. Will she need ? yes  Next available at [facility] July, no Saturdays. Paul does have Saturday, appt made for  May 13th, 10am. Can use IPAD to assist with translation. Instruct patient to wear a mask, bring ID, insurance. Arrive 9:45am. dont use deodorant, lotions, no creams, no talc powder on body. Patient contacted to provide appt date and time and instructions.

## 2023-04-14 ENCOUNTER — OFFICE VISIT (OUTPATIENT)
Dept: OTOLARYNGOLOGY | Facility: CLINIC | Age: 63
End: 2023-04-14

## 2023-04-14 DIAGNOSIS — R07.0 THROAT PAIN: Primary | ICD-10-CM

## 2023-04-14 PROCEDURE — 99203 OFFICE O/P NEW LOW 30 MIN: CPT | Performed by: OTOLARYNGOLOGY

## 2023-04-14 RX ORDER — MONTELUKAST SODIUM 10 MG/1
10 TABLET ORAL NIGHTLY
Qty: 30 TABLET | Refills: 3 | Status: SHIPPED | OUTPATIENT
Start: 2023-04-14

## 2023-04-14 RX ORDER — AZELASTINE 1 MG/ML
2 SPRAY, METERED NASAL 2 TIMES DAILY
Qty: 30 ML | Refills: 0 | Status: SHIPPED | OUTPATIENT
Start: 2023-04-14

## 2023-04-14 RX ORDER — LORATADINE 10 MG/1
10 TABLET ORAL DAILY
Qty: 30 TABLET | Refills: 3 | Status: SHIPPED | OUTPATIENT
Start: 2023-04-14

## 2023-05-13 ENCOUNTER — HOSPITAL ENCOUNTER (OUTPATIENT)
Dept: MAMMOGRAPHY | Age: 63
Discharge: HOME | End: 2023-05-13
Attending: NURSE PRACTITIONER
Payer: MEDICAID

## 2023-05-13 DIAGNOSIS — Z12.31 SCREENING MAMMOGRAM FOR BREAST CANCER: ICD-10-CM

## 2023-05-13 PROCEDURE — 77063 BREAST TOMOSYNTHESIS BI: CPT | Performed by: NURSE PRACTITIONER

## 2023-05-13 PROCEDURE — 77067 SCR MAMMO BI INCL CAD: CPT | Performed by: NURSE PRACTITIONER

## 2023-07-11 ENCOUNTER — OFFICE VISIT (OUTPATIENT)
Dept: UROLOGY | Facility: HOSPITAL | Age: 63
End: 2023-07-11
Attending: OBSTETRICS & GYNECOLOGY
Payer: MEDICAID

## 2023-07-11 VITALS — HEIGHT: 60 IN | TEMPERATURE: 99 F | BODY MASS INDEX: 33.57 KG/M2 | WEIGHT: 171 LBS

## 2023-07-11 DIAGNOSIS — N95.2 VAGINAL ATROPHY: ICD-10-CM

## 2023-07-11 DIAGNOSIS — N81.2 INCOMPLETE UTEROVAGINAL PROLAPSE: Primary | ICD-10-CM

## 2023-07-11 DIAGNOSIS — N81.11 CYSTOCELE, MIDLINE: ICD-10-CM

## 2023-07-11 DIAGNOSIS — N81.6 RECTOCELE: ICD-10-CM

## 2023-07-11 DIAGNOSIS — E66.1 CLASS 1 DRUG-INDUCED OBESITY WITHOUT SERIOUS COMORBIDITY WITH BODY MASS INDEX (BMI) OF 33.0 TO 33.9 IN ADULT: ICD-10-CM

## 2023-07-11 PROCEDURE — 99212 OFFICE O/P EST SF 10 MIN: CPT

## 2023-08-10 ENCOUNTER — OFFICE VISIT (OUTPATIENT)
Dept: UROLOGY | Facility: HOSPITAL | Age: 63
End: 2023-08-10
Payer: MEDICAID

## 2023-08-10 VITALS — BODY MASS INDEX: 33.57 KG/M2 | WEIGHT: 171 LBS | HEIGHT: 60 IN | RESPIRATION RATE: 18 BRPM

## 2023-08-10 DIAGNOSIS — N81.11 CYSTOCELE, MIDLINE: ICD-10-CM

## 2023-08-10 DIAGNOSIS — N81.6 RECTOCELE: ICD-10-CM

## 2023-08-10 DIAGNOSIS — N81.2 INCOMPLETE UTEROVAGINAL PROLAPSE: Primary | ICD-10-CM

## 2023-08-10 LAB
BLOOD URINE: NEGATIVE
CONTROL RUN WITHIN 24 HOURS?: YES
LEUKOCYTE ESTERASE URINE: NEGATIVE
NITRITE URINE: NEGATIVE

## 2023-08-10 PROCEDURE — 51741 ELECTRO-UROFLOWMETRY FIRST: CPT

## 2023-08-10 PROCEDURE — 51784 ANAL/URINARY MUSCLE STUDY: CPT

## 2023-08-10 PROCEDURE — 51797 INTRAABDOMINAL PRESSURE TEST: CPT

## 2023-08-10 PROCEDURE — 51729 CYSTOMETROGRAM W/VP&UP: CPT

## 2023-08-10 PROCEDURE — 81002 URINALYSIS NONAUTO W/O SCOPE: CPT

## 2023-08-10 NOTE — PROCEDURES
Patient here for urodynamic testing. Procedure explained by hospital provided  service. Spoke with Ross Hernandez [phone] and confirmed by patient. See evaluation form for results. Both verbal and written discharge instructions were given. Patient tolerated procedure well and will follow up with Dr. Wily Villalobos on 2023 for UDS follow up. [facility] provided  services throughout UDS testing with patients consent. URODYNAMIC EVALUATION    PATIENT HISTORY:    Prolapse:  Yes (Reports splinting for comfort)  ELVER:  No  UUI:  No  Nocturia:  1 (Reports 0 to 1 time during the night)  Frequency:  every 2-3 hours  Sense of Incomplete Emptying:  No  Constipation:  No  Last void prior to UDS testin.5 hours  Current urge to void? None  OAB meds stopped prior to test?  NA  Other symptoms? Surgery? []  No  [x]  Interested in surgery. Surgery not scheduled at the time of UDS testing. Surgical packet not provided at the time of UDS testing. Surgical folder provided? []  Yes  [x]  No     PATIENT DIAGNOSIS:  Cystocele, Midline N81.11, Rectocele, Midline R15.9, and Uterovaginal Prolapse N81.2 (incomplete)    UDS PROCEDURAL FINDINGS:  Cystocele, Midline N81.11, Rectocele, Midline R15.9, and Uterovaginal Prolapse N81.3 (complete)    MEDICATION: No outpatient medications have been marked as taking for the 8/10/23 encounter (Office Visit) with [facility] PROCEDURE. ALLERGIES:  Patient has no known allergies. EXAM:  Urinalysis Dip:  No visits with results within 1 Day(s) from this visit. Latest known visit with results is:    Today's Results   Component Date Value    WBC 2023 8.1     RBC 2023 4.59     HGB 2023 13.5     HCT 2023 41.3     MCV 2023 90.0     MCH 2023 29.4     MCHC 2023 32.7     RDW 2023 12.4     RDW-SD 2023 40.7     PLT 2023 245.0     Glucose 2023 115 (H)     Sodium 2023 143     Potassium 2023 3.9     Chloride 04/01/2023 109     CO2 04/01/2023 29.0     Anion Gap 04/01/2023 5     BUN 04/01/2023 16     Creatinine 04/01/2023 0.72     BUN/CREA Ratio 04/01/2023 22.2 (H)     Calcium, Total 04/01/2023 9.1     Calculated Osmolality 04/01/2023 298 (H)     eGFR-Cr 04/01/2023 94     ALT 04/01/2023 22     AST 04/01/2023 19     Alkaline Phosphatase 04/01/2023 120     Bilirubin, Total 04/01/2023 0.3     Total Protein 04/01/2023 7.0     Albumin 04/01/2023 3.2 (L)     Globulin  04/01/2023 3.8     A/G Ratio 04/01/2023 0.8 (L)     Patient Fasting for CMP? 04/01/2023 Yes     HgbA1C 04/01/2023 6.3 (H)     Estimated Average Glucose 04/01/2023 134 (H)     Cholesterol, Total 04/01/2023 187     HDL Cholesterol 04/01/2023 53     Triglycerides 04/01/2023 158 (H)     LDL Cholesterol 04/01/2023 106 (H)     VLDL 04/01/2023 27     Non HDL Chol 04/01/2023 134 (H)     Patient Fasting for Lipi* 04/01/2023 Yes     TSH 04/01/2023 2.020     Vitamin B12 04/01/2023 306       [place] ( > 30 degrees )  [x]  Mobile  []  Fixed    Perineal Sensation:  [x]  Normal  []  Abnormal    Additional Notes:    PROLAPSE:  []  Yes  [x]  No  Prolapse reduced for testing?   []  Yes  [x]  No  []  Pessary  []  Manual  []  Half Speculum    Additional Notes:    UROFLOWMETRY:  Unreduced    Voided Volume:                76             mL  Maximum Flow Rate:                   20             mL/sec  Average flow rate:                 6             mL/sec  Post-void Residual:               5                        mL  Pattern:  []  Normal  [x]  Poor flow     [x]  Intermittent []  Other  Void:   []  Typical  []  Atypical    Additional Notes: Uroflow obtained unreduced    CYSTOMETRY:  Urethral Catheter:  Fr 7 / tdoc  Abd Catheter:     Fr 7 / tdoc   Infusion:  Water Rate 30 mL/min  Temp:  Room  Position:  [x]  Sit  []  Stand  []  Supine  First sensation:   73 mL  First desire to void:   131 mL  Strong desire to void:  300 mL  Maximum cystometric capacity: 300 mL  Detrusor Activity:  [x]  Unstable   []  Stable  Urge leakage? []  Yes [x]  No  Volume at 1st unhibited detrusor cont:   100 mL  Detrusor instability provoked by:    [x]  Spontaneous []  Coughing  [x]  Filling  []  Valsalva  []  Other    Additional Notes:      URETHRAL FUNCTION:  Valsava (vesical) Leak Point Pressures:    Volume Leak Point Pressure Leak? Cough Valsalva      100 mL 94 115    cm H2O [x]  Yes with cough and valsalva []  No   150 mL 117 109    cm H2O [x]  Yes leak with cough []  No   200 mL 149 104    cm H2O []  Yes [x]  No   250 mL 147 85 cm H2O []  Yes [x]  No   300 mL 119 76 cm H2O []  Yes [x]  No       Genuine Stress Incontinence demonstrated?    [x]  Yes @100 unreduced in the absence of an uninhibited contraction  []  No    Resting Urethral Pressure Profile:     Functional Urethral Length:         1.3 cm            1.5     cm     Maximum UCP:          50 cm          43   cm       PRESSURE/FLOW STUDY:  Unreduced    Voided volume:   364     mL  Maximum flow rate:     29   mL/sec  Pressure Detrusor (at maximum flow):      13      cm H2O  Post void residual:         0      mL  Voiding mechanism:  []  Abnormal  [x]  Normal  []  Strain to void   []  Weak detrusor  Void:   [x]  Typical   []  Atypical    Additional Notes:    EMG:  During fill: Reactive    During flow study: Reactive    8/10/2023 3:42 PM     PERFORMED BY:  Aubrie Burleson RN

## 2024-04-04 ENCOUNTER — OFFICE VISIT (OUTPATIENT)
Dept: FAMILY MEDICINE CLINIC | Facility: CLINIC | Age: 64
End: 2024-04-04

## 2024-04-04 VITALS
HEART RATE: 79 BPM | BODY MASS INDEX: 33 KG/M2 | DIASTOLIC BLOOD PRESSURE: 68 MMHG | WEIGHT: 167 LBS | SYSTOLIC BLOOD PRESSURE: 122 MMHG

## 2024-04-04 DIAGNOSIS — E55.9 VITAMIN D DEFICIENCY: ICD-10-CM

## 2024-04-04 DIAGNOSIS — Z12.31 ENCOUNTER FOR SCREENING MAMMOGRAM FOR MALIGNANT NEOPLASM OF BREAST: ICD-10-CM

## 2024-04-04 DIAGNOSIS — R05.1 ACUTE COUGH: ICD-10-CM

## 2024-04-04 DIAGNOSIS — Z23 NEED FOR SHINGLES VACCINE: ICD-10-CM

## 2024-04-04 DIAGNOSIS — G89.29 CHRONIC BILATERAL LOW BACK PAIN WITHOUT SCIATICA: ICD-10-CM

## 2024-04-04 DIAGNOSIS — Z00.00 ENCOUNTER FOR ANNUAL HEALTH EXAMINATION: Primary | ICD-10-CM

## 2024-04-04 DIAGNOSIS — M54.50 CHRONIC BILATERAL LOW BACK PAIN WITHOUT SCIATICA: ICD-10-CM

## 2024-04-04 DIAGNOSIS — Z12.4 CERVICAL CANCER SCREENING: ICD-10-CM

## 2024-04-04 PROCEDURE — 99396 PREV VISIT EST AGE 40-64: CPT | Performed by: FAMILY MEDICINE

## 2024-04-04 RX ORDER — ZOSTER VACCINE RECOMBINANT, ADJUVANTED 50 MCG/0.5
0.5 KIT INTRAMUSCULAR ONCE
Qty: 1 EACH | Refills: 0 | Status: SHIPPED | OUTPATIENT
Start: 2024-04-04 | End: 2024-04-04

## 2024-04-04 RX ORDER — CODEINE PHOSPHATE AND GUAIFENESIN 10; 100 MG/5ML; MG/5ML
5 SOLUTION ORAL EVERY 6 HOURS PRN
Qty: 118 ML | Refills: 0 | Status: SHIPPED | OUTPATIENT
Start: 2024-04-04

## 2024-04-04 NOTE — PROGRESS NOTES
HPI:   Joslyn Olivia is a 63 year old female who presents for a complete physical exam.    + chronic low back pain     Wt Readings from Last 3 Encounters:   24 167 lb (75.8 kg)   08/10/23 171 lb (77.6 kg)   23 171 lb (77.6 kg)     Body mass index is 32.61 kg/m².       Current Outpatient Medications   Medication Sig Dispense Refill    Zoster Vac Recomb Adjuvanted (SHINGRIX) 50 MCG/0.5ML Intramuscular Recon Susp Inject 0.5 mL into the muscle one time for 1 dose. 1 each 0    guaiFENesin-codeine (CHERATUSSIN AC) 100-10 MG/5ML Oral Solution Take 5 mL by mouth every 6 (six) hours as needed. 118 mL 0    montelukast 10 MG Oral Tab Take 1 tablet (10 mg total) by mouth nightly. (Patient not taking: Reported on 2024) 30 tablet 3    loratadine 10 MG Oral Tab Take 1 tablet (10 mg total) by mouth daily. (Patient not taking: Reported on 2023) 30 tablet 3    azelastine 0.1 % Nasal Solution 2 sprays by Nasal route 2 (two) times daily. (Patient not taking: Reported on 2024) 30 mL 0    Multiple Vitamin (MULTIVITAMIN) Oral Tab Take 1 tablet by mouth daily. (Patient not taking: Reported on 2024)      Calcium Carb-Cholecalciferol (CALCIUM 600/VITAMIN D) 600-10 MG-MCG Oral Tab Take 1 tablet by mouth daily. (Patient not taking: Reported on 2024) 90 tablet 3      History reviewed. No pertinent past medical history.   Past Surgical History:   Procedure Laterality Date      1995    COLONOSCOPY      Resurrection    COLONOSCOPY N/A 2022    Procedure: COLONOSCOPY;  Surgeon: Odilon Byrnes MD;  Location: [facility]      Family History   Problem Relation Age of Onset    No Known Problems Self     No Known Problems Mother     No Known Problems Sister     No Known Problems Maternal Grandmother     No Known Problems Paternal Grandmother     No Known Problems Maternal Aunt     No Known Problems Paternal Aunt     No Known Problems Maternal Cousin Female     No Known Problems Maternal Cousin Male     No  Known Problems Paternal Cousin Female     No Known Problems Paternal Cousin Male     No Known Problems Other     No Known Problems Father     Breast Cancer Neg     Ovarian Cancer Neg     DCIS Neg     LCIS Neg     BRCA gene + Neg     Ashkenazi [employer]ish Descent Neg       Social History:   Social History     Socioeconomic History    Marital status:    Tobacco Use    Smoking status: Never     Passive exposure: Never    Smokeless tobacco: Never   Vaping Use    Vaping Use: Never used   Substance and Sexual Activity    Alcohol use: No    Drug use: Not Currently    Sexual activity: Not Currently   Social History Narrative    Lives at home with  and two adult children (25 and 26).           REVIEW OF SYSTEMS:   Negative, except per HPI.     EXAM:   /68   Pulse 79   Wt 167 lb (75.8 kg)   BMI 32.61 kg/m²     GENERAL: well developed, well nourished, in no apparent distress  SKIN: no rashes, no suspicious lesions  HEENT: atraumatic, normocephalic, ears are clear  EYES: PERRLA, EOMI,conjunctiva are clear  NECK: supple, no adenopathy  LUNGS: clear to auscultation  CARDIO: RRR without murmur  GI: good BS, no masses or tenderness  MUSCULOSKELETAL: back is not tender, FROM of the back  EXTREMITIES: no cyanosis or edema  NEURO: Oriented times three, cranial nerves are intact, motor and sensory are grossly intact    ASSESSMENT AND PLAN:   Joslyn Olivia is a 63 year old female who presents for a complete physical exam.    1. Encounter for annual health examination  - CBC With Differential With Platelet; Future  - Comp Metabolic Panel (14); Future  - Hemoglobin A1C; Future  - Lipid Panel; Future  - TSH W Reflex To Free T4; Future  - OBG - INTERNAL  - [facility] HANK 2D+3D SCREENING BILAT (CPT=77067/46665); Future    2. Cervical cancer screening  - OBG - INTERNAL    3. Encounter for screening mammogram for malignant neoplasm of breast  - [facility] HANK 2D+3D SCREENING BILAT (CPT=77067/06295); Future    4. Vitamin D deficiency  -  Vitamin D [E]; Future    5. Need for shingles vaccine  - Zoster Vac Recomb Adjuvanted (SHINGRIX) 50 MCG/0.5ML Intramuscular Recon Susp; Inject 0.5 mL into the muscle one time for 1 dose.  Dispense: 1 each; Refill: 0    6. Acute cough  [place].  Appropriate use discussed with patient.  - guaiFENesin-codeine (CHERATUSSIN AC) 100-10 MG/5ML Oral Solution; Take 5 mL by mouth every 6 (six) hours as needed.  Dispense: 118 mL; Refill: 0    7. Chronic bilateral low back pain without sciatica  - Physiatry Referral - [place] ([facility])  - XR LUMBAR SPINE (MIN 4 VIEWS) (CPT=72110); Future       Lisha Monk MD  4/4/2024  6:07 PM

## 2024-04-06 ENCOUNTER — HOSPITAL ENCOUNTER (OUTPATIENT)
Dept: GENERAL RADIOLOGY | Age: 64
Discharge: HOME | End: 2024-04-06
Attending: FAMILY MEDICINE
Payer: MEDICAID

## 2024-04-06 ENCOUNTER — LAB ENCOUNTER (OUTPATIENT)
Dept: LAB | Age: 64
End: 2024-04-06
Attending: FAMILY MEDICINE
Payer: MEDICAID

## 2024-04-06 DIAGNOSIS — M54.50 CHRONIC BILATERAL LOW BACK PAIN WITHOUT SCIATICA: ICD-10-CM

## 2024-04-06 DIAGNOSIS — G89.29 CHRONIC BILATERAL LOW BACK PAIN WITHOUT SCIATICA: ICD-10-CM

## 2024-04-06 DIAGNOSIS — E55.9 VITAMIN D DEFICIENCY: ICD-10-CM

## 2024-04-06 DIAGNOSIS — Z00.00 ENCOUNTER FOR ANNUAL HEALTH EXAMINATION: ICD-10-CM

## 2024-04-06 LAB
ALBUMIN SERPL-MCNC: 4.2 G/DL (ref 3.2–4.8)
ALBUMIN/GLOB SERPL: 1.5 {RATIO} (ref 1–2)
ALP LIVER SERPL-CCNC: 132 U/L
ALT SERPL-CCNC: 13 U/L
ANION GAP SERPL CALC-SCNC: 3 MMOL/L (ref 0–18)
AST SERPL-CCNC: 24 U/L (ref ?–34)
BASOPHILS # BLD AUTO: 0.04 X10(3) UL (ref 0–0.2)
BASOPHILS NFR BLD AUTO: 0.5 %
BILIRUB SERPL-MCNC: 0.4 MG/DL (ref 0.2–1.1)
BUN BLD-MCNC: 13 MG/DL (ref 9–23)
BUN/CREAT SERPL: 17.6 (ref 10–20)
CALCIUM BLD-MCNC: 9.3 MG/DL (ref 8.7–10.4)
CHLORIDE SERPL-SCNC: 108 MMOL/L (ref 98–112)
CHOLEST SERPL-MCNC: 173 MG/DL (ref ?–200)
CO2 SERPL-SCNC: 31 MMOL/L (ref 21–32)
CREAT BLD-MCNC: 0.74 MG/DL
DEPRECATED RDW RBC AUTO: 41.3 FL (ref 35.1–46.3)
EGFRCR SERPLBLD CKD-EPI 2021: 91 ML/MIN/1.73M2 (ref 60–?)
EOSINOPHIL # BLD AUTO: 0.38 X10(3) UL (ref 0–0.7)
EOSINOPHIL NFR BLD AUTO: 4.9 %
ERYTHROCYTE [DISTWIDTH] IN BLOOD BY AUTOMATED COUNT: 12.6 % (ref 11–15)
EST. AVERAGE GLUCOSE BLD GHB EST-MCNC: 128 MG/DL (ref 68–126)
FASTING PATIENT LIPID ANSWER: YES
FASTING STATUS PATIENT QL REPORTED: YES
GLOBULIN PLAS-MCNC: 2.8 G/DL (ref 2.8–4.4)
GLUCOSE BLD-MCNC: 101 MG/DL (ref 70–99)
HBA1C MFR BLD: 6.1 % (ref ?–5.7)
HCT VFR BLD AUTO: 41.8 %
HDLC SERPL-MCNC: 47 MG/DL (ref 40–59)
HGB BLD-MCNC: 13.6 G/DL
IMM GRANULOCYTES # BLD AUTO: 0.02 X10(3) UL (ref 0–1)
IMM GRANULOCYTES NFR BLD: 0.3 %
LDLC SERPL CALC-MCNC: 103 MG/DL (ref ?–100)
LYMPHOCYTES # BLD AUTO: 2.6 X10(3) UL (ref 1–4)
LYMPHOCYTES NFR BLD AUTO: 33.4 %
MCH RBC QN AUTO: 29.1 PG (ref 26–34)
MCHC RBC AUTO-ENTMCNC: 32.5 G/DL (ref 31–37)
MCV RBC AUTO: 89.5 FL
MONOCYTES # BLD AUTO: 0.54 X10(3) UL (ref 0.1–1)
MONOCYTES NFR BLD AUTO: 6.9 %
NEUTROPHILS # BLD AUTO: 4.21 X10 (3) UL (ref 1.5–7.7)
NEUTROPHILS # BLD AUTO: 4.21 X10(3) UL (ref 1.5–7.7)
NEUTROPHILS NFR BLD AUTO: 54 %
NONHDLC SERPL-MCNC: 126 MG/DL (ref ?–130)
OSMOLALITY SERPL CALC.SUM OF ELEC: 294 MOSM/KG (ref 275–295)
PLATELET # BLD AUTO: 265 10(3)UL (ref 150–450)
POTASSIUM SERPL-SCNC: 4.2 MMOL/L (ref 3.5–5.1)
PROT SERPL-MCNC: 7 G/DL (ref 5.7–8.2)
RBC # BLD AUTO: 4.67 X10(6)UL
SODIUM SERPL-SCNC: 142 MMOL/L (ref 136–145)
TRIGL SERPL-MCNC: 126 MG/DL (ref 30–149)
TSI SER-ACNC: 2.62 MIU/ML (ref 0.55–4.78)
VIT D+METAB SERPL-MCNC: 29.4 NG/ML (ref 30–100)
VLDLC SERPL CALC-MCNC: 21 MG/DL (ref 0–30)
WBC # BLD AUTO: 7.8 X10(3) UL (ref 4–11)

## 2024-04-06 PROCEDURE — 85025 COMPLETE CBC W/AUTO DIFF WBC: CPT

## 2024-04-06 PROCEDURE — 82306 VITAMIN D 25 HYDROXY: CPT

## 2024-04-06 PROCEDURE — 36415 COLL VENOUS BLD VENIPUNCTURE: CPT

## 2024-04-06 PROCEDURE — 83036 HEMOGLOBIN GLYCOSYLATED A1C: CPT

## 2024-04-06 PROCEDURE — 84443 ASSAY THYROID STIM HORMONE: CPT

## 2024-04-06 PROCEDURE — 72110 X-RAY EXAM L-2 SPINE 4/>VWS: CPT | Performed by: FAMILY MEDICINE

## 2024-04-06 PROCEDURE — 80053 COMPREHEN METABOLIC PANEL: CPT

## 2024-04-06 PROCEDURE — 80061 LIPID PANEL: CPT

## (undated) DIAGNOSIS — Z00.00 PREVENTATIVE HEALTH CARE: Primary | ICD-10-CM

## (undated) DIAGNOSIS — Z13.220 SCREENING FOR LIPID DISORDERS: ICD-10-CM

## (undated) DEVICE — KIT CLEAN ENDOKIT 1.1OZ GOWNX2

## (undated) DEVICE — 35 ML SYRINGE REGULAR TIP: Brand: MONOJECT

## (undated) DEVICE — FORCEP RADIAL JAW 4

## (undated) DEVICE — MEDI-VAC NON-CONDUCTIVE SUCTION TUBING 6MM X 1.8M (6FT.) L: Brand: CARDINAL HEALTH

## (undated) DEVICE — LINE MNTR ADLT SET O2 INTMD

## (undated) DEVICE — KIT ENDO ORCAPOD 160/180/190

## (undated) DEVICE — STERILE LATEX POWDER-FREE SURGICAL GLOVESWITH NITRILE COATING: Brand: PROTEXIS

## (undated) NOTE — LETTER
[address] [facility] UROGYNECOLOGY  [address]  [facility]: [phone]  FAX: [phone]      Dear Dr. Godinez ref. provider found,    I had the pleasure of seeing your patient, Chandana Lucio on 8/10/2023 as per your request for consultation. As you recall her chief complaint is UDS Procedure  . I have reviewed her medical and surgical history and completed a physical examination with particular attention to her pelvic floor. My impression includes:    (N81.2) Incomplete uterovaginal prolapse  (primary encounter diagnosis)    (N81.11) Cystocele, midline    (N81.6) Rectocele      After reviewing her treatment options, our plan with be:    TREATMENT PLAN, NON-SURGICAL:  { UG TREATMENT PLAN, NON-SURGICAL:4179}    TREATMENT PLAN, SURGICAL:  { UG PROCEDURES DISCUSSED:4178}    I very much appreciate the opportunity to be involved in the care of your patient. Please do not hesitate to contact me if you have any concerns of questions.     Sincerely,    Tiffanie Guerra RN

## (undated) NOTE — LETTER
September 10, 2021    [address] [place] [address] 30,     Cheney azucar todavia esta dentro del rango de pre-diabetes. Recomiendo que trate de limitar azucar, arroz, pan y tortillas.  Podemos repetir jaimie laboratorio en 6-

## (undated) NOTE — LETTER
1/12/2023              Hua Reveles        [address] ([address] [place]) [id number]         Dear Destin Rao,    This letter is to inform you that our office has made several attempts to reach you by phone without success. We were attempting to contact you by phone to schedule an appointment with Dominique JERONIMO to discuss EGD/esophagram and possible small bowel imaging since you had erosions on the terminal ileum. Please contact our office at the number listed below as soon as you receive this letter to discuss this issue and to make the necessary changes in our system to your contact information. Thank you for your cooperation.         Sincerely,    MD Doug Black [place] [address]  [phone]